# Patient Record
Sex: MALE | Race: BLACK OR AFRICAN AMERICAN | NOT HISPANIC OR LATINO | URBAN - METROPOLITAN AREA
[De-identification: names, ages, dates, MRNs, and addresses within clinical notes are randomized per-mention and may not be internally consistent; named-entity substitution may affect disease eponyms.]

---

## 2019-06-27 ENCOUNTER — EMERGENCY (EMERGENCY)
Facility: HOSPITAL | Age: 29
LOS: 1 days | Discharge: HOME | End: 2019-06-27
Attending: EMERGENCY MEDICINE | Admitting: EMERGENCY MEDICINE
Payer: COMMERCIAL

## 2019-06-27 VITALS
RESPIRATION RATE: 18 BRPM | DIASTOLIC BLOOD PRESSURE: 89 MMHG | SYSTOLIC BLOOD PRESSURE: 145 MMHG | HEART RATE: 65 BPM | TEMPERATURE: 98 F | WEIGHT: 195.11 LBS | OXYGEN SATURATION: 97 %

## 2019-06-27 VITALS
SYSTOLIC BLOOD PRESSURE: 122 MMHG | HEART RATE: 68 BPM | TEMPERATURE: 98 F | OXYGEN SATURATION: 98 % | RESPIRATION RATE: 18 BRPM | DIASTOLIC BLOOD PRESSURE: 78 MMHG

## 2019-06-27 DIAGNOSIS — R07.89 OTHER CHEST PAIN: ICD-10-CM

## 2019-06-27 PROCEDURE — 93010 ELECTROCARDIOGRAM REPORT: CPT

## 2019-06-27 PROCEDURE — 93005 ELECTROCARDIOGRAM TRACING: CPT

## 2019-06-27 PROCEDURE — 99284 EMERGENCY DEPT VISIT MOD MDM: CPT

## 2019-06-27 PROCEDURE — 71046 X-RAY EXAM CHEST 2 VIEWS: CPT

## 2019-06-27 PROCEDURE — 71046 X-RAY EXAM CHEST 2 VIEWS: CPT | Mod: 26

## 2019-06-27 PROCEDURE — 99283 EMERGENCY DEPT VISIT LOW MDM: CPT | Mod: 25

## 2019-06-27 RX ORDER — FAMOTIDINE 10 MG/ML
20 INJECTION INTRAVENOUS ONCE
Refills: 0 | Status: COMPLETED | OUTPATIENT
Start: 2019-06-27 | End: 2019-06-27

## 2019-06-27 RX ORDER — ACETAMINOPHEN 500 MG
650 TABLET ORAL ONCE
Refills: 0 | Status: COMPLETED | OUTPATIENT
Start: 2019-06-27 | End: 2019-06-27

## 2019-06-27 RX ADMIN — Medication 650 MILLIGRAM(S): at 17:04

## 2019-06-27 RX ADMIN — Medication 650 MILLIGRAM(S): at 18:18

## 2019-06-27 RX ADMIN — Medication 30 MILLILITER(S): at 17:04

## 2019-06-27 RX ADMIN — FAMOTIDINE 20 MILLIGRAM(S): 10 INJECTION INTRAVENOUS at 17:04

## 2019-06-27 NOTE — ED ADULT NURSE NOTE - CHPI ED NUR SYMPTOMS NEG
no chills/no shortness of breath/no vomiting/no fever/no syncope/no congestion/no diaphoresis/no dizziness/no nausea/no back pain

## 2019-06-27 NOTE — ED PROVIDER NOTE - CLINICAL SUMMARY MEDICAL DECISION MAKING FREE TEXT BOX
30M PMh HLD, no other cardiac/PE risk factors, p/w 2-3 mos of non-exertional intermittent R cp, today worse after food. Already following with a cardiologist. No other systemic symptoms. Vitals wnl, exam as above.  ddx: Clinically benign. Likely GERD/gastritis/PUD vs. MSK. clinically not ACS, PE, tamponade, dissection, PTX, perf, myocarditis, mediastinitis.   Though EKG has [id number], pt hx, vitals, exam not c/w PE. PERC negative, low probability wells.   Will check CXR, symptom control, reassess.

## 2019-06-27 NOTE — ED PROVIDER NOTE - OBJECTIVE STATEMENT
29M PMh HLD p/w cp, R sided, x2-3mos, "burning/sharp," intermittent lasting minutes. Had followed up with cardiologist and had stress test with unknown results, has f/u w/ cards in 2 days. Came to ED today bec pain was slightly worse than usual. 2d ago also had brief radiation of pain to RUE, none since then. Denies associated SOB, NVD, lightheaded, diaphoresis, palpitations, cough/rhinorrhea, black/bloody stool, LE pain/swelling, focal weakness/numbness, recent travel/immobilization, abd pain, urinary complaints, f/c. No hormone use. No FMH CAD/clots/sudden death. Has not tried any OTC meds for the pain. Pain overall is unchanged w/ food, but pain worsened today after eating pizza. Pt is active and pain is not worse w/ exertion.

## 2019-06-27 NOTE — ED ADULT NURSE NOTE - OBJECTIVE STATEMENT
Patient c/o of right sided chest pain, intermittent, sharp, burning pain,  no SOB or nausea/vomitting, no diaphoresis, states symptoms X 2 months and being seen by Cardiologist.  Denies dizziness, palpitations or syncope.   EKG w/ T wave abnormality , evaluated by Dr. Bernal. Patient c/o of right sided chest pain, intermittent, sharp, burning pain,  no SOB or nausea/vomitting, no diaphoresis, states symptoms X 2 months and being seen by Cardiologist.  Denies dizziness, palpitations or syncope.   EKG w/ T wave abnormality , evaluated by Dr. Mak.

## 2019-06-27 NOTE — ED ADULT NURSE REASSESSMENT NOTE - NS ED NURSE REASSESS COMMENT FT1
Patient aoX 3, no chest pain or SOB complaint, NSR on cardiac monitor, vital signs stable. Administered Maalox , ACetaminophen, Pepcid PO as ordered.  Xray done.  REsults and disposition pending.

## 2019-06-27 NOTE — ED PROVIDER NOTE - NSFOLLOWUPINSTRUCTIONS_ED_ALL_ED_FT
Can take tylenol every 6hrs as needed for pain.  Stay well hydrated.  Return for fevers, persistent vomit, uncontrolled pain, worsening breathing, worsening lightheaded.  Follow up with primary doctor within 1-2 days.   Follow up with cardiologist.   Avoid fried/spicy/oily foods.    Chest Pain    Chest pain can be caused by many different conditions which may or may not be dangerous. Causes include heartburn, lung infections, heart attack, blood clot in lungs, skin infections, strain or damage to muscle, cartilage, or bones, etc. In addition to a history and physical examination, an electrocardiogram (ECG) or other lab tests may have been performed to determine the cause of your chest pain. Follow up with your primary care provider or with a cardiologist as instructed.     SEEK IMMEDIATE MEDICAL CARE IF YOU HAVE ANY OF THE FOLLOWING SYMPTOMS: worsening chest pain, coughing up blood, unexplained back/neck/jaw pain, severe abdominal pain, dizziness or lightheadedness, fainting, shortness of breath, sweaty or clammy skin, vomiting, or racing heart beat. These symptoms may represent a serious problem that is an emergency. Do not wait to see if the symptoms will go away. Get medical help right away. Call 911 and do not drive yourself to the hospital.

## 2019-06-27 NOTE — ED ADULT NURSE REASSESSMENT NOTE - NS ED NURSE REASSESS COMMENT FT1
All tests resulted, XRay done, vital signs stable, no chest pain, SOB or any other symptom complaint .  Discharged to home in stable condition.
